# Patient Record
Sex: FEMALE
[De-identification: names, ages, dates, MRNs, and addresses within clinical notes are randomized per-mention and may not be internally consistent; named-entity substitution may affect disease eponyms.]

---

## 2018-05-28 ENCOUNTER — FORM ENCOUNTER (OUTPATIENT)
Age: 38
End: 2018-05-28

## 2018-12-16 ENCOUNTER — FORM ENCOUNTER (OUTPATIENT)
Age: 38
End: 2018-12-16

## 2019-05-06 ENCOUNTER — FORM ENCOUNTER (OUTPATIENT)
Age: 39
End: 2019-05-06

## 2020-09-13 ENCOUNTER — FORM ENCOUNTER (OUTPATIENT)
Age: 40
End: 2020-09-13

## 2020-09-26 ENCOUNTER — TRANSCRIPTION ENCOUNTER (OUTPATIENT)
Age: 40
End: 2020-09-26

## 2021-08-06 PROBLEM — Z86.19 HISTORY OF CLOSTRIDIOIDES DIFFICILE COLITIS: Status: RESOLVED | Noted: 2021-08-06 | Resolved: 2021-08-06

## 2021-08-09 ENCOUNTER — APPOINTMENT (OUTPATIENT)
Dept: BREAST CENTER | Facility: CLINIC | Age: 41
End: 2021-08-09
Payer: COMMERCIAL

## 2021-08-09 DIAGNOSIS — Z86.19 PERSONAL HISTORY OF OTHER INFECTIOUS AND PARASITIC DISEASES: ICD-10-CM

## 2021-08-09 PROCEDURE — 99213 OFFICE O/P EST LOW 20 MIN: CPT

## 2021-08-13 ENCOUNTER — TRANSCRIPTION ENCOUNTER (OUTPATIENT)
Age: 41
End: 2021-08-13

## 2022-08-08 ENCOUNTER — APPOINTMENT (OUTPATIENT)
Dept: BREAST CENTER | Facility: CLINIC | Age: 42
End: 2022-08-08

## 2022-08-08 VITALS
HEART RATE: 74 BPM | TEMPERATURE: 97.9 F | RESPIRATION RATE: 18 BRPM | WEIGHT: 106 LBS | HEIGHT: 65 IN | SYSTOLIC BLOOD PRESSURE: 101 MMHG | DIASTOLIC BLOOD PRESSURE: 68 MMHG | BODY MASS INDEX: 17.66 KG/M2

## 2022-08-08 DIAGNOSIS — Z78.9 OTHER SPECIFIED HEALTH STATUS: ICD-10-CM

## 2022-08-08 PROCEDURE — 99213 OFFICE O/P EST LOW 20 MIN: CPT

## 2023-06-16 ENCOUNTER — NON-APPOINTMENT (OUTPATIENT)
Age: 43
End: 2023-06-16

## 2023-09-05 NOTE — PHYSICAL EXAM
[de-identified] : Breast/Axilla/Supraclavicular- no palpable masses, adenopathy or nipple discharge, Left breast > Right breast

## 2023-09-05 NOTE — PAST MEDICAL HISTORY
[History of Hormone Replacement Treatment] : has no history of hormone replacement treatment [FreeTextEntry6] : No [FreeTextEntry7] : Yes [FreeTextEntry8] : No

## 2023-09-05 NOTE — HISTORY OF PRESENT ILLNESS
[FreeTextEntry1] : Patient is a 42yo F who presents for breast cancer screening. She was followed for bilateral nipple discharge for one month in 2019 that has resolved. Denies fam hx of breast or ovarian cancer. Patient denies palpable masses, skin changes, or nipple discharge bilaterally.   5/2/18: B/l MG (baseline)- Extremely dense, bilateral cysts, R- 2:00 3FN solid mass 0.6 cm diameter, most likely island of benign fibrocystic tissue, 6:00 RA solid mass consistent with fibroadenoma, rec six month f/u US 11/28/18: B/L US ([facility]): BI-RADS 2 benign findings 5/7/19: B/l MG & US- multiple cysts in both breasts, TIFF 8/9/21: B/L MG & US- Extremely dense. L asymmetry, stable. US- Small cysts, stable. BIRADS 2. 8/8/22: B/L MG & US- dense, bilateral [id number] 2 9/11/23: B/l MG & US- scheduled

## 2023-09-11 ENCOUNTER — APPOINTMENT (OUTPATIENT)
Dept: BREAST CENTER | Facility: CLINIC | Age: 43
End: 2023-09-11
Payer: COMMERCIAL

## 2023-09-11 ENCOUNTER — NON-APPOINTMENT (OUTPATIENT)
Age: 43
End: 2023-09-11

## 2023-09-11 VITALS
WEIGHT: 108 LBS | HEART RATE: 65 BPM | BODY MASS INDEX: 17.99 KG/M2 | DIASTOLIC BLOOD PRESSURE: 77 MMHG | SYSTOLIC BLOOD PRESSURE: 110 MMHG | HEIGHT: 65 IN

## 2023-09-11 DIAGNOSIS — Z78.9 OTHER SPECIFIED HEALTH STATUS: ICD-10-CM

## 2023-09-11 DIAGNOSIS — N64.9 DISORDER OF BREAST, UNSPECIFIED: ICD-10-CM

## 2023-09-11 DIAGNOSIS — Z87.898 PERSONAL HISTORY OF OTHER SPECIFIED CONDITIONS: ICD-10-CM

## 2023-09-11 PROCEDURE — 99213 OFFICE O/P EST LOW 20 MIN: CPT

## 2024-09-05 PROBLEM — R92.30 DENSE BREASTS: Status: ACTIVE | Noted: 2024-09-05

## 2024-09-10 ENCOUNTER — NON-APPOINTMENT (OUTPATIENT)
Age: 44
End: 2024-09-10

## 2024-09-11 ENCOUNTER — APPOINTMENT (OUTPATIENT)
Dept: BREAST CENTER | Facility: CLINIC | Age: 44
End: 2024-09-11
Payer: COMMERCIAL

## 2024-09-11 VITALS
WEIGHT: 110 LBS | HEART RATE: 74 BPM | DIASTOLIC BLOOD PRESSURE: 77 MMHG | BODY MASS INDEX: 18.33 KG/M2 | SYSTOLIC BLOOD PRESSURE: 113 MMHG | HEIGHT: 65 IN

## 2024-09-11 DIAGNOSIS — R92.30 DENSE BREASTS, UNSPECIFIED: ICD-10-CM

## 2024-09-11 DIAGNOSIS — R92.8 OTHER ABNORMAL AND INCONCLUSIVE FINDINGS ON DIAGNOSTIC IMAGING OF BREAST: ICD-10-CM

## 2024-09-11 DIAGNOSIS — N64.9 DISORDER OF BREAST, UNSPECIFIED: ICD-10-CM

## 2024-09-11 PROCEDURE — 99214 OFFICE O/P EST MOD 30 MIN: CPT

## 2024-09-11 RX ORDER — BACILLUS COAGULANS/INULIN 1B-250 MG
CAPSULE ORAL
Refills: 0 | Status: ACTIVE | COMMUNITY

## 2024-09-11 RX ORDER — UBIDECARENONE/VIT E ACET 100MG-5
CAPSULE ORAL
Refills: 0 | Status: ACTIVE | COMMUNITY

## 2024-09-11 NOTE — PHYSICAL EXAM
[Normocephalic] : normocephalic [EOMI] : extra ocular movement intact [Supple] : supple [No Supraclavicular Adenopathy] : no supraclavicular adenopathy [No Cervical Adenopathy] : no cervical adenopathy [de-identified] : Breast/Axilla/Supraclavicular- no palpable masses, adenopathy or nipple discharge, Left breast > Right breast

## 2024-09-11 NOTE — HISTORY OF PRESENT ILLNESS
[FreeTextEntry1] : Patient is a 43yo F who presents for breast cancer screening. She was followed for bilateral nipple discharge for one month in 2019 that has resolved. Denies fam hx of breast or ovarian cancer. Patient denies palpable masses, skin changes, or nipple discharge bilaterally.  MICHEL: 11.5  5/2/18: B/l MG (baseline)- Extremely dense, bilateral cysts, R- 2:00 3FN solid mass 0.6 cm diameter, most likely island of benign fibrocystic tissue, 6:00 RA solid mass consistent with fibroadenoma, rec six month f/u US 11/28/18: B/L US ([facility]): BI-RADS 2 benign findings 5/7/19: B/l MG & US- multiple cysts in both breasts, TIFF 8/9/21: B/L MG & US- Extremely dense. L asymmetry, stable. US- Small cysts, stable. BIRADS 2. 8/8/22: B/L MG & US- dense, bilateral [id number] 2 9/11/23: B/l MG & US- extremely dense. US- B/l small cysts. BI-RADS 2 9/11/24: B/l MG/US-heterogeneously dense.  B/L multiple circumscribed masses, benign.  US-B/L cysts, largest L 2:00 3 CFN 0.9 cm cyst with debris.  R 9:00 4 CFN 0.9 cm solid-appearing mass with partially microlobulated borders parallel to skin surface, indeterminant (rec R US BX)

## 2024-09-11 NOTE — PHYSICAL EXAM
[Normocephalic] : normocephalic [EOMI] : extra ocular movement intact [Supple] : supple [No Supraclavicular Adenopathy] : no supraclavicular adenopathy [No Cervical Adenopathy] : no cervical adenopathy [de-identified] : Breast/Axilla/Supraclavicular- no palpable masses, adenopathy or nipple discharge, Left breast > Right breast

## 2024-09-11 NOTE — PAST MEDICAL HISTORY
[Menstruating] : The patient is menstruating [Menarche Age ____] : age at menarche was [unfilled] [Definite ___ (Date)] : the last menstrual period was [unfilled] [Normal Amount/Duration] : it was of a normal amount and duration [Regular Cycle Intervals] : have been regular [Total Preg ___] : G[unfilled] [Live Births ___] : P[unfilled]  [Age At Live Birth ___] : Age at live birth: [unfilled] [History of Hormone Replacement Treatment] : has no history of hormone replacement treatment [FreeTextEntry6] : No [FreeTextEntry7] : Yes [FreeTextEntry8] : No

## 2024-09-11 NOTE — HISTORY OF PRESENT ILLNESS
[FreeTextEntry1] : Patient is a 43yo F who presents for breast cancer screening. She was followed for bilateral nipple discharge for one month in 2019 that has resolved. Denies fam hx of breast or ovarian cancer. Patient denies palpable masses, skin changes, or nipple discharge bilaterally.  MICHEL: 11.5  5/2/18: B/l MG (baseline)- Extremely dense, bilateral cysts, R- 2:00 3FN solid mass 0.6 cm diameter, most likely island of benign fibrocystic tissue, 6:00 RA solid mass consistent with fibroadenoma, rec six month f/u US 11/28/18: B/L US ([facility]): BI-RADS 2 benign findings 5/7/19: B/l MG & US- multiple cysts in both breasts, TIFF 8/9/21: B/L MG & US- Extremely dense. L asymmetry, stable. US- Small cysts, stable. BIRADS 2. 8/8/22: B/L MG & US- dense, bilateral [id number] 2 9/11/23: B/l MG & US- extremely dense. US- B/l small cysts. BI-RADS 2 9/11/24: B/l MG/US-heterogeneously dense.  B/L multiple circumscribed masses, benign.  US-B/L cysts, largest L 2:00 3 CFN 0.9 cm cyst with debris.  R 9:00 4 CFN 0.9 cm solid-appearing mass with partially microlobulated borders parallel to skin surface, indeterminant (rec R US BX)

## 2024-10-25 ENCOUNTER — RESULT REVIEW (OUTPATIENT)
Age: 44
End: 2024-10-25

## 2024-10-29 ENCOUNTER — NON-APPOINTMENT (OUTPATIENT)
Age: 44
End: 2024-10-29

## 2024-10-30 ENCOUNTER — NON-APPOINTMENT (OUTPATIENT)
Age: 44
End: 2024-10-30

## 2025-09-18 ENCOUNTER — APPOINTMENT (OUTPATIENT)
Dept: BREAST CENTER | Facility: CLINIC | Age: 45
End: 2025-09-18